# Patient Record
Sex: MALE | Race: WHITE | NOT HISPANIC OR LATINO | URBAN - METROPOLITAN AREA
[De-identification: names, ages, dates, MRNs, and addresses within clinical notes are randomized per-mention and may not be internally consistent; named-entity substitution may affect disease eponyms.]

---

## 2018-11-19 ENCOUNTER — PREPPED CHART (OUTPATIENT)
Dept: URBAN - METROPOLITAN AREA CLINIC 20 | Facility: CLINIC | Age: 73
End: 2018-11-19

## 2022-01-17 ASSESSMENT — VISUAL ACUITY
OS_SC: 20/20
OD_SC: 20/25-1

## 2022-01-17 ASSESSMENT — KERATOMETRY
OS_K2POWER_DIOPTERS: 43.75
OS_K1POWER_DIOPTERS: 43.75
OD_K2POWER_DIOPTERS: 43.75
OS_AXISANGLE_DEGREES: 0
OD_AXISANGLE_DEGREES: 35
OS_AXISANGLE2_DEGREES: 90
OD_AXISANGLE2_DEGREES: 125
OD_K1POWER_DIOPTERS: 43.50

## 2022-01-17 ASSESSMENT — TONOMETRY
OD_IOP_MMHG: 14
OS_IOP_MMHG: 14

## 2022-01-19 ENCOUNTER — COMPREHENSIVE EXAM (OUTPATIENT)
Dept: URBAN - METROPOLITAN AREA CLINIC 20 | Facility: CLINIC | Age: 77
End: 2022-01-19

## 2022-01-19 DIAGNOSIS — E11.9: ICD-10-CM

## 2022-01-19 DIAGNOSIS — H40.1131: ICD-10-CM

## 2022-01-19 PROCEDURE — 99213 OFFICE O/P EST LOW 20 MIN: CPT

## 2022-01-19 ASSESSMENT — KERATOMETRY
OD_AXISANGLE_DEGREES: 028
OS_AXISANGLE2_DEGREES: 65
OD_K2POWER_DIOPTERS: 48.75
OS_AXISANGLE_DEGREES: 155
OS_K1POWER_DIOPTERS: 44.00
OD_K1POWER_DIOPTERS: 48.20
OD_AXISANGLE2_DEGREES: 118
OS_K2POWER_DIOPTERS: 44.25

## 2022-01-19 ASSESSMENT — VISUAL ACUITY
OD_SC: 20/20+2
OS_SC: 20/20

## 2022-01-19 ASSESSMENT — TONOMETRY
OD_IOP_MMHG: 14
OS_IOP_MMHG: 14

## 2022-10-27 ENCOUNTER — COMPREHENSIVE EXAM (OUTPATIENT)
Dept: URBAN - METROPOLITAN AREA CLINIC 20 | Facility: CLINIC | Age: 77
End: 2022-10-27

## 2022-10-27 DIAGNOSIS — C44.1922: ICD-10-CM

## 2022-10-27 PROCEDURE — 92012 INTRM OPH EXAM EST PATIENT: CPT

## 2022-10-27 ASSESSMENT — KERATOMETRY
OS_AXISANGLE_DEGREES: 155
OS_K2POWER_DIOPTERS: 44.25
OD_AXISANGLE_DEGREES: 028
OD_K2POWER_DIOPTERS: 48.75
OD_AXISANGLE2_DEGREES: 118
OD_K1POWER_DIOPTERS: 48.20
OS_AXISANGLE2_DEGREES: 65
OS_K1POWER_DIOPTERS: 44.00

## 2022-10-27 ASSESSMENT — TONOMETRY
OD_IOP_MMHG: 10
OS_IOP_MMHG: 10

## 2022-10-27 ASSESSMENT — VISUAL ACUITY
OS_SC: 20/20-2
OD_SC: 20/30+3

## 2022-10-27 NOTE — PATIENT DISCUSSION
PT has a lump that has recently developed under the skin below RLL. PT does have Leukemia so his Oncologist wants a biopsy done . Recommend procedure be preformed at [facility].

## 2022-10-27 NOTE — PATIENT DISCUSSION
Recommended excision/biopsy due to patient discomfort/suspicious appearance/ and PT previous Diagnosis of Leukemia.

## 2022-11-03 ENCOUNTER — PRE-OP/H&P (OUTPATIENT)
Dept: URBAN - METROPOLITAN AREA CLINIC 20 | Facility: CLINIC | Age: 77
End: 2022-11-03

## 2022-11-03 DIAGNOSIS — C44.1922: ICD-10-CM

## 2022-11-03 PROCEDURE — 99211PRE PRE OP VISIT

## 2022-11-03 ASSESSMENT — KERATOMETRY
OS_AXISANGLE2_DEGREES: 65
OS_AXISANGLE_DEGREES: 155
OS_K2POWER_DIOPTERS: 44.25
OD_AXISANGLE_DEGREES: 028
OD_K1POWER_DIOPTERS: 48.20
OD_K2POWER_DIOPTERS: 48.75
OD_AXISANGLE2_DEGREES: 118
OS_K1POWER_DIOPTERS: 44.00

## 2022-11-03 NOTE — PATIENT DISCUSSION
PT has a lump that has recently developed under the skin below RLL. PT does have Leukemia so his Oncologist wants a biopsy done . Recommend procedure be preformed at [facility], [place].

## 2022-11-11 ENCOUNTER — POST-OP (OUTPATIENT)
Dept: URBAN - METROPOLITAN AREA CLINIC 19 | Facility: CLINIC | Age: 77
End: 2022-11-11

## 2022-11-11 DIAGNOSIS — C44.1922: ICD-10-CM

## 2022-11-11 PROCEDURE — 99024 POSTOP FOLLOW-UP VISIT: CPT

## 2022-11-11 ASSESSMENT — VISUAL ACUITY
OU_SC: 20/20
OD_SC: 20/40+3
OS_SC: 20/20

## 2022-11-11 ASSESSMENT — KERATOMETRY
OD_K1POWER_DIOPTERS: 48.20
OD_AXISANGLE_DEGREES: 028
OD_K2POWER_DIOPTERS: 48.75
OS_K1POWER_DIOPTERS: 44.00
OS_AXISANGLE_DEGREES: 155
OD_AXISANGLE2_DEGREES: 118
OS_AXISANGLE2_DEGREES: 65
OS_K2POWER_DIOPTERS: 44.25

## 2022-11-11 ASSESSMENT — TONOMETRY
OD_IOP_MMHG: 14
OS_IOP_MMHG: 12

## 2022-11-23 ENCOUNTER — FOLLOW UP (OUTPATIENT)
Dept: URBAN - METROPOLITAN AREA CLINIC 20 | Facility: CLINIC | Age: 77
End: 2022-11-23

## 2022-11-23 DIAGNOSIS — D23.111: ICD-10-CM

## 2022-11-23 PROCEDURE — 92012 INTRM OPH EXAM EST PATIENT: CPT

## 2022-11-23 ASSESSMENT — VISUAL ACUITY
OD_SC: 20/40+2
OU_SC: 20/20
OS_SC: 20/20

## 2022-11-23 ASSESSMENT — TONOMETRY
OD_IOP_MMHG: 10
OS_IOP_MMHG: 11

## 2023-09-18 ENCOUNTER — ESTABLISHED PATIENT (OUTPATIENT)
Dept: URBAN - METROPOLITAN AREA CLINIC 20 | Facility: CLINIC | Age: 78
End: 2023-09-18

## 2023-09-18 DIAGNOSIS — D23.112: ICD-10-CM

## 2023-09-18 PROCEDURE — 92012 INTRM OPH EXAM EST PATIENT: CPT

## 2023-09-18 RX ORDER — NEOMYCIN SULFATE, POLYMYXIN B SULFATE AND DEXAMETHASONE SUSPENSION/ DROPS 1; 3.5; 1 MG/ML; MG/ML; [USP'U]/ML: 1 SUSPENSION/ DROPS TOPICAL

## 2023-09-18 ASSESSMENT — KERATOMETRY
OS_AXISANGLE_DEGREES: 142
OD_K1POWER_DIOPTERS: 43.00
OS_K1POWER_DIOPTERS: 43.50
OS_K2POWER_DIOPTERS: 44.00
OD_AXISANGLE2_DEGREES: 163
OD_K2POWER_DIOPTERS: 43.50
OS_AXISANGLE2_DEGREES: 52
OD_AXISANGLE_DEGREES: 73

## 2023-09-18 ASSESSMENT — VISUAL ACUITY
OU_SC: 20/30+2
OD_SC: 20/40+2
OS_SC: 20/25

## 2023-09-18 ASSESSMENT — TONOMETRY
OS_IOP_MMHG: 7
OD_IOP_MMHG: 13

## 2023-10-18 ASSESSMENT — KERATOMETRY
OS_AXISANGLE_DEGREES: 142
OS_K1POWER_DIOPTERS: 43.50
OD_K2POWER_DIOPTERS: 43.50
OS_AXISANGLE2_DEGREES: 52
OD_K1POWER_DIOPTERS: 43.00
OS_K2POWER_DIOPTERS: 44.00
OD_AXISANGLE_DEGREES: 73
OD_AXISANGLE2_DEGREES: 163

## 2023-10-19 ENCOUNTER — POST-OP (OUTPATIENT)
Dept: URBAN - METROPOLITAN AREA CLINIC 20 | Facility: CLINIC | Age: 78
End: 2023-10-19

## 2023-10-19 DIAGNOSIS — Z98.890: ICD-10-CM

## 2023-10-19 PROCEDURE — 99024 POSTOP FOLLOW-UP VISIT: CPT

## 2023-10-19 ASSESSMENT — TONOMETRY
OS_IOP_MMHG: 13
OD_IOP_MMHG: 19

## 2023-10-19 ASSESSMENT — VISUAL ACUITY
OU_SC: 20/25-1
OS_SC: 20/25
OD_SC: 20/30

## 2023-10-27 ENCOUNTER — POST-OP (OUTPATIENT)
Dept: URBAN - METROPOLITAN AREA CLINIC 20 | Facility: CLINIC | Age: 78
End: 2023-10-27

## 2023-10-27 DIAGNOSIS — Z98.890: ICD-10-CM

## 2023-10-27 PROCEDURE — 99024 POSTOP FOLLOW-UP VISIT: CPT

## 2023-10-27 ASSESSMENT — KERATOMETRY
OS_AXISANGLE_DEGREES: 142
OD_AXISANGLE_DEGREES: 73
OS_K1POWER_DIOPTERS: 43.50
OD_K1POWER_DIOPTERS: 43.00
OS_AXISANGLE2_DEGREES: 52
OS_K2POWER_DIOPTERS: 44.00
OD_K2POWER_DIOPTERS: 43.50
OD_AXISANGLE2_DEGREES: 163

## 2023-10-27 ASSESSMENT — TONOMETRY
OD_IOP_MMHG: 12
OS_IOP_MMHG: 11

## 2023-10-27 ASSESSMENT — VISUAL ACUITY
OD_SC: 20/30
OS_SC: 20/25